# Patient Record
Sex: FEMALE | Race: BLACK OR AFRICAN AMERICAN | NOT HISPANIC OR LATINO | Employment: FULL TIME | URBAN - METROPOLITAN AREA
[De-identification: names, ages, dates, MRNs, and addresses within clinical notes are randomized per-mention and may not be internally consistent; named-entity substitution may affect disease eponyms.]

---

## 2021-12-13 ENCOUNTER — TELEPHONE (OUTPATIENT)
Dept: FAMILY MEDICINE CLINIC | Facility: CLINIC | Age: 26
End: 2021-12-13

## 2021-12-13 NOTE — TELEPHONE ENCOUNTER
Letter was sent to the clinical pool and should have been mailed to patient.  Please review letter with patient.    Greg Antonio MD  Family Medicine - [facility] [place]

## 2021-12-13 NOTE — TELEPHONE ENCOUNTER
Caller: Earlene Dove    Relationship: Self    Best call back number: [phone]     Caller requesting test results: SELF    What test was performed: LABS  When was the test performed: NOVEMBER    Where was the test performed: [facility]    Additional notes:

## 2024-07-10 ENCOUNTER — OFFICE VISIT (OUTPATIENT)
Dept: FAMILY MEDICINE CLINIC | Facility: CLINIC | Age: 29
End: 2024-07-10
Payer: COMMERCIAL

## 2024-07-10 VITALS
DIASTOLIC BLOOD PRESSURE: 78 MMHG | BODY MASS INDEX: 31.76 KG/M2 | WEIGHT: 197.6 LBS | TEMPERATURE: 98.2 F | OXYGEN SATURATION: 99 % | HEART RATE: 72 BPM | SYSTOLIC BLOOD PRESSURE: 121 MMHG | HEIGHT: 66 IN

## 2024-07-10 DIAGNOSIS — Z30.9 ENCOUNTER FOR CONTRACEPTIVE MANAGEMENT, UNSPECIFIED TYPE: ICD-10-CM

## 2024-07-10 DIAGNOSIS — R73.03 PREDIABETES: ICD-10-CM

## 2024-07-10 DIAGNOSIS — N92.6 IRREGULAR PERIODS: ICD-10-CM

## 2024-07-10 DIAGNOSIS — T75.3XXA MOTION SICKNESS, INITIAL ENCOUNTER: ICD-10-CM

## 2024-07-10 PROBLEM — Z30.44 ENCOUNTER FOR SURVEILLANCE OF VAGINAL RING HORMONAL CONTRACEPTIVE DEVICE: Status: ACTIVE | Noted: 2024-07-10

## 2024-07-10 PROCEDURE — 99213 OFFICE O/P EST LOW 20 MIN: CPT | Performed by: STUDENT IN AN ORGANIZED HEALTH CARE EDUCATION/TRAINING PROGRAM

## 2024-07-10 RX ORDER — SCOLOPAMINE TRANSDERMAL SYSTEM 1 MG/1
1 PATCH, EXTENDED RELEASE TRANSDERMAL
Qty: 4 EACH | Refills: 0 | Status: SHIPPED | OUTPATIENT
Start: 2024-07-10

## 2024-07-10 RX ORDER — ETONOGESTREL AND ETHINYL ESTRADIOL VAGINAL RING .015; .12 MG/D; MG/D
RING VAGINAL
Qty: 3 EACH | Refills: 4 | Status: SHIPPED | OUTPATIENT
Start: 2024-07-10

## 2024-07-10 NOTE — PROGRESS NOTES
"  Established Patient Office Visit        Subjective      Chief Complaint:  Menstrual Problem (Per patient she didn't have much of a period at all in May; states it was very light. She states she then had a normal period in June. She states she had the nuva ring during this time. )      History of Present Illness: Earlene Mckay is a 28 y.o. female who presents for irregular vaginal bleeding. Abnormal light bleeding month of may. Had mild sharp pain associated with it. No pain since. No missed period     Patient with some increased irritability. No new  weight gain or palpitations        Patient Active Problem List   Diagnosis    Prediabetes    De Quervain's tenosynovitis, left    Encounter for surveillance of vaginal ring hormonal contraceptive device         Current Outpatient Medications:     etonogestrel-ethinyl estradiol (NuvaRing) 0.12-0.015 MG/24HR vaginal ring, Insert vaginally and leave in place for 3 consecutive weeks, then remove for 1 week., Disp: 3 each, Rfl: 4    Scopolamine 1 MG/3DAYS patch, Place 1 patch on the skin as directed by provider Every 72 (Seventy-Two) Hours., Disp: 4 each, Rfl: 0       Objective     Physical Exam:   Vital Signs:   /78   Pulse 72   Temp 98.2 °F (36.8 °C) (Infrared)   Ht 167.6 cm (65.98\")   Wt 89.6 kg (197 lb 9.6 oz)   LMP 06/13/2024 (Exact Date)   SpO2 99%   BMI 31.91 kg/m²      Physical Exam  Constitutional:       General: She is not in acute distress.     Appearance: She is not ill-appearing.   Abd soft nontender no adenexal tenderness            Assessment / Plan      Assessment/Plan:   Diagnoses and all orders for this visit:    1. Irregular periods  No warning signs.  And it seems to return to normal.  Follow-up with OB/GYN for worsening.  Recommend follow-up with GYN for Pap smear as well    2. Prediabetes  Assessment & Plan:  Continue healthy diet       3. Encounter for contraceptive management, unspecified type  -     etonogestrel-ethinyl estradiol " (NuvaRing) 0.12-0.015 MG/24HR vaginal ring; Insert vaginally and leave in place for 3 consecutive weeks, then remove for 1 week.  Dispense: 3 each; Refill: 4    4. Motion sickness, initial encounter  -     Scopolamine 1 MG/3DAYS patch; Place 1 patch on the skin as directed by provider Every 72 (Seventy-Two) Hours.  Dispense: 4 each; Refill: 0           Follow Up:   Return in about 6 months (around 1/10/2025) for Wellness visit.    MDM:     Greg Antonio MD  [place] [facility] [place]

## 2025-03-10 ENCOUNTER — LAB (OUTPATIENT)
Dept: LAB | Facility: HOSPITAL | Age: 30
End: 2025-03-10
Payer: COMMERCIAL

## 2025-03-10 ENCOUNTER — OFFICE VISIT (OUTPATIENT)
Dept: FAMILY MEDICINE CLINIC | Facility: CLINIC | Age: 30
End: 2025-03-10
Payer: COMMERCIAL

## 2025-03-10 VITALS
BODY MASS INDEX: 31.4 KG/M2 | HEART RATE: 80 BPM | TEMPERATURE: 98 F | DIASTOLIC BLOOD PRESSURE: 76 MMHG | OXYGEN SATURATION: 98 % | WEIGHT: 195.4 LBS | HEIGHT: 66 IN | SYSTOLIC BLOOD PRESSURE: 123 MMHG

## 2025-03-10 DIAGNOSIS — Z00.00 ENCOUNTER FOR ROUTINE ADULT HEALTH EXAMINATION WITHOUT ABNORMAL FINDINGS: ICD-10-CM

## 2025-03-10 DIAGNOSIS — N92.0 MENORRHAGIA WITH REGULAR CYCLE: ICD-10-CM

## 2025-03-10 DIAGNOSIS — M25.531 RIGHT WRIST PAIN: ICD-10-CM

## 2025-03-10 DIAGNOSIS — Z30.9 ENCOUNTER FOR CONTRACEPTIVE MANAGEMENT, UNSPECIFIED TYPE: ICD-10-CM

## 2025-03-10 LAB
ALBUMIN SERPL-MCNC: 3.9 G/DL (ref 3.5–5.2)
ALBUMIN/GLOB SERPL: 1.2 G/DL
ALP SERPL-CCNC: 60 U/L (ref 39–117)
ALT SERPL W P-5'-P-CCNC: 16 U/L (ref 1–33)
ANION GAP SERPL CALCULATED.3IONS-SCNC: 11.6 MMOL/L (ref 5–15)
AST SERPL-CCNC: 25 U/L (ref 1–32)
BILIRUB SERPL-MCNC: 0.3 MG/DL (ref 0–1.2)
BUN SERPL-MCNC: 20 MG/DL (ref 6–20)
BUN/CREAT SERPL: 21.7 (ref 7–25)
CALCIUM SPEC-SCNC: 9.7 MG/DL (ref 8.6–10.5)
CHLORIDE SERPL-SCNC: 102 MMOL/L (ref 98–107)
CHOLEST SERPL-MCNC: 254 MG/DL (ref 0–200)
CO2 SERPL-SCNC: 26.4 MMOL/L (ref 22–29)
CREAT SERPL-MCNC: 0.92 MG/DL (ref 0.57–1)
EGFRCR SERPLBLD CKD-EPI 2021: 85.5 ML/MIN/1.73
GLOBULIN UR ELPH-MCNC: 3.3 GM/DL
GLUCOSE SERPL-MCNC: 88 MG/DL (ref 65–99)
HBA1C MFR BLD: 5.8 % (ref 4.8–5.6)
HDLC SERPL-MCNC: 89 MG/DL (ref 40–60)
IRON 24H UR-MRATE: 77 MCG/DL (ref 37–145)
IRON SATN MFR SERPL: 17 % (ref 20–50)
LDLC SERPL CALC-MCNC: 154 MG/DL (ref 0–100)
LDLC/HDLC SERPL: 1.7 {RATIO}
POTASSIUM SERPL-SCNC: 4.5 MMOL/L (ref 3.5–5.2)
PROT SERPL-MCNC: 7.2 G/DL (ref 6–8.5)
SODIUM SERPL-SCNC: 140 MMOL/L (ref 136–145)
TIBC SERPL-MCNC: 453 MCG/DL (ref 298–536)
TRANSFERRIN SERPL-MCNC: 304 MG/DL (ref 200–360)
TRIGL SERPL-MCNC: 69 MG/DL (ref 0–150)
TSH SERPL DL<=0.05 MIU/L-ACNC: 0.78 UIU/ML (ref 0.27–4.2)
VLDLC SERPL-MCNC: 11 MG/DL (ref 5–40)

## 2025-03-10 PROCEDURE — 84466 ASSAY OF TRANSFERRIN: CPT

## 2025-03-10 PROCEDURE — 83036 HEMOGLOBIN GLYCOSYLATED A1C: CPT

## 2025-03-10 PROCEDURE — 99395 PREV VISIT EST AGE 18-39: CPT | Performed by: STUDENT IN AN ORGANIZED HEALTH CARE EDUCATION/TRAINING PROGRAM

## 2025-03-10 PROCEDURE — 80050 GENERAL HEALTH PANEL: CPT

## 2025-03-10 PROCEDURE — 83540 ASSAY OF IRON: CPT

## 2025-03-10 PROCEDURE — 80061 LIPID PANEL: CPT

## 2025-03-10 RX ORDER — ETONOGESTREL AND ETHINYL ESTRADIOL VAGINAL RING .015; .12 MG/D; MG/D
RING VAGINAL
Qty: 3 EACH | Refills: 4 | Status: SHIPPED | OUTPATIENT
Start: 2025-03-10

## 2025-03-10 NOTE — PROGRESS NOTES
"     Female Physical Note      Patient Name: Earlene Mckay  : 1995   MRN: [id number]     Subjective     Subjective      Chief Complaint:    Chief Complaint   Patient presents with    Annual Exam       History of Present Illness: Earlnee Mckay is a 29 y.o. female who is here today for their annual health maintenance and physical.     Review of Systems:   Review of Systems    Past Medical History, Social History, Family History and Care Team were all reviewed with patient and updated as appropriate.     Medications:     Current Outpatient Medications:     etonogestrel-ethinyl estradiol (NuvaRing) 0.12-0.015 MG/24HR vaginal ring, Insert vaginally and leave in place for 3 consecutive weeks, then remove for 1 week., Disp: 3 each, Rfl: 4    Scopolamine 1 MG/3DAYS patch, Place 1 patch on the skin as directed by provider Every 72 (Seventy-Two) Hours. (Patient not taking: Reported on 3/10/2025), Disp: 4 each, Rfl: 0    Allergies:   Allergies   Allergen Reactions    Latex Itching       Immunizations:  Td/Tdap(Booster Q 10 yrs):  tdap  Flu (Yearly):  utd     Pap:    Last Completed Pap Smear            Upcoming       PAP SMEAR (Every 5 Years) Next due on 9/10/2029      09/10/2024  Patient-Reported (Performed Externally)                           Mammogram:    Last Completed Mammogram    This patient has no relevant Health Maintenance data.       Diet/Physical activity: discussed       PHQ-2 Depression Screening  Little interest or pleasure in doing things? Not at all   Feeling down, depressed, or hopeless? Not at all   PHQ-2 Total Score 0        Objective   Objective     Physical Exam:  Vital Signs:   Vitals:    03/10/25 0909   BP: 123/76   Pulse: 80   Temp: 98 °F (36.7 °C)   TempSrc: Infrared   SpO2: 98%   Weight: 88.6 kg (195 lb 6.4 oz)   Height: 167.6 cm (65.98\")   PainSc: 0-No pain     Body mass index is 31.55 kg/m².     Physical Exam  Constitutional:       General: She is not in acute distress.     Appearance: She " is not ill-appearing.   Cardiovascular:      Rate and Rhythm: Normal rate and regular rhythm.   Pulmonary:      Effort: Pulmonary effort is normal.      Breath sounds: Normal breath sounds.   Neurological:      Mental Status: She is alert.   Psychiatric:         Thought Content: Thought content normal.         Right wrist: no point tenderness to the right wrist.  Negative Finkelstein.  Some mild pain when she has radial rotation of the wrist.  No swelling seen    Procedures    Assessment / Plan      Assessment/Plan:   Diagnoses and all orders for this visit:    1. Encounter for routine adult health examination without abnormal findings (Primary)  -     Comprehensive Metabolic Panel; Future  -     Lipid Panel; Future  -     Hemoglobin A1c; Future  -     CBC (No Diff); Future  -     Iron Profile; Future    2. Right wrist pain  Exercises given for wrist tendinitis.  Call if she wants referral to PT    3. Encounter for contraceptive management, unspecified type  -     etonogestrel-ethinyl estradiol (NuvaRing) 0.12-0.015 MG/24HR vaginal ring; Insert vaginally and leave in place for 3 consecutive weeks, then remove for 1 week.  Dispense: 3 each; Refill: 4    4. Menorrhagia with regular cycle  -     CBC (No Diff); Future  -     Iron Profile; Future           Follow Up:   No follow-ups on file.    Healthcare Maintenance:   Health maintenance counseling included discussion of healthy diet and physical activity.  Dental hygiene.  Vaccinations.  Earlene Woody voices understanding and acceptance of this advice and will call back with any further questions or concerns. AVS with preventive healthcare tips printed for patient.     Greg Antonio MD   [facility] [place]

## 2025-03-11 LAB
DEPRECATED RDW RBC AUTO: 41.4 FL (ref 37–54)
ERYTHROCYTE [DISTWIDTH] IN BLOOD BY AUTOMATED COUNT: 12.7 % (ref 12.3–15.4)
HCT VFR BLD AUTO: 41.1 % (ref 34–46.6)
HGB BLD-MCNC: 13.2 G/DL (ref 12–15.9)
MCH RBC QN AUTO: 28.5 PG (ref 26.6–33)
MCHC RBC AUTO-ENTMCNC: 32.1 G/DL (ref 31.5–35.7)
MCV RBC AUTO: 88.8 FL (ref 79–97)
PLATELET # BLD AUTO: 312 10*3/MM3 (ref 140–450)
PMV BLD AUTO: 8.9 FL (ref 6–12)
RBC # BLD AUTO: 4.63 10*6/MM3 (ref 3.77–5.28)
WBC NRBC COR # BLD AUTO: 5.73 10*3/MM3 (ref 3.4–10.8)